# Patient Record
Sex: FEMALE | Race: WHITE | NOT HISPANIC OR LATINO | Employment: FULL TIME | URBAN - METROPOLITAN AREA
[De-identification: names, ages, dates, MRNs, and addresses within clinical notes are randomized per-mention and may not be internally consistent; named-entity substitution may affect disease eponyms.]

---

## 2018-03-24 ENCOUNTER — OFFICE VISIT (OUTPATIENT)
Dept: URGENT CARE | Facility: CLINIC | Age: 23
End: 2018-03-24
Payer: COMMERCIAL

## 2018-03-24 VITALS
WEIGHT: 115 LBS | OXYGEN SATURATION: 98 % | SYSTOLIC BLOOD PRESSURE: 98 MMHG | DIASTOLIC BLOOD PRESSURE: 64 MMHG | BODY MASS INDEX: 19.63 KG/M2 | HEART RATE: 76 BPM | HEIGHT: 64 IN | TEMPERATURE: 99 F

## 2018-03-24 DIAGNOSIS — T50.905A ADVERSE EFFECT OF DRUG, INITIAL ENCOUNTER: ICD-10-CM

## 2018-03-24 PROCEDURE — 99204 OFFICE O/P NEW MOD 45 MIN: CPT | Performed by: FAMILY MEDICINE

## 2018-03-24 RX ORDER — BUPROPION HYDROCHLORIDE 75 MG/1
75 TABLET ORAL 2 TIMES DAILY
COMMUNITY

## 2018-03-24 RX ORDER — PROPRANOLOL HYDROCHLORIDE 10 MG/1
10 TABLET ORAL 3 TIMES DAILY
COMMUNITY
End: 2023-09-22

## 2018-03-24 RX ORDER — PREDNISONE 10 MG/1
30 TABLET ORAL DAILY
Qty: 9 TAB | Refills: 0 | Status: SHIPPED | OUTPATIENT
Start: 2018-03-24 | End: 2018-03-27

## 2018-03-24 NOTE — PROGRESS NOTES
"Subjective:      Chief Complaint   Patient presents with   • Rash     all over body                Rash  This is a new problem. The current episode started this morning.   She is currently on day 6 of 7 of clindamycin for dental infection.   States infection has resolved. The rash is unchanged. The rash is diffuse. The rash is characterized by redness, swelling and itchiness. Pertinent negatives include no cough, fatigue, fever, shortness of breath or sore throat. Past treatments include nothing.            Past medical history was unremarkable and not pertinent to current issue  Social hx - denies tobacco, alcohol, drug use  Family hx was reviewed - no pertinent past family hx    Review of Systems   Constitutional: Negative for fever and fatigue.   HENT: Negative for eye redness, sore throat.    Respiratory: Negative for cough and shortness of breath.    CV - denies cp, sob  abd - denies n/v/d.  Ext - denies calf pain.  Psych - denies depression, anxiety  Skin: Positive for rash, itching   - denies dysuria, d/c  10 point ROS otherwise negative, except per HPI  .             Objective:   Blood pressure (!) 98/64, pulse 76, temperature 37.2 °C (99 °F), height 1.626 m (5' 4\"), weight 52.2 kg (115 lb), SpO2 98 %.    Physical Exam   Constitutional: pt is oriented to person, place, and time. Pt appears well-developed and well-nourished. No distress.   HENT:   Head: Normocephalic and atraumatic.   Mouth/Throat: Oropharynx is clear and moist and mucous membranes are normal. No uvula swelling. No oropharyngeal exudate, posterior oropharyngeal edema or posterior oropharyngeal erythema.   Eyes: Conjunctivae are normal.   Cardiovascular: Normal rate, regular rhythm and normal heart sounds.    Pulmonary/Chest: Effort normal and breath sounds normal. No respiratory distress. She has no wheezes.   Neurological: pt is alert and oriented to person, place, and time. No cranial nerve deficit.   Skin: Rash (diffuse evanescent " macules and wheals over torso and ext.   ) noted. Pt is not diaphoretic. There is erythema.   Psychiatric: pt's behavior is normal.   Nursing note and vitals reviewed.              Assessment/Plan:     1.hives  2.  Adverse effect of drug, initial encounter  -this likely represents clindamycin allergy  Discontinue clinda    - predniSONE (DELTASONE) 10 MG Tab; Take 3 Tabs by mouth every day for 3 days.  Dispense: 9 Tab; Refill: 0

## 2020-10-29 ENCOUNTER — HOSPITAL ENCOUNTER (OUTPATIENT)
Dept: LAB | Facility: MEDICAL CENTER | Age: 25
End: 2020-10-29
Payer: COMMERCIAL

## 2020-10-30 LAB
COVID ORDER STATUS COVID19: NORMAL
SARS-COV-2 RNA RESP QL NAA+PROBE: NOTDETECTED
SPECIMEN SOURCE: NORMAL

## 2020-11-10 ENCOUNTER — HOSPITAL ENCOUNTER (OUTPATIENT)
Dept: LAB | Facility: MEDICAL CENTER | Age: 25
End: 2020-11-10
Payer: COMMERCIAL

## 2022-12-02 ENCOUNTER — OFFICE VISIT (OUTPATIENT)
Dept: URGENT CARE | Facility: PHYSICIAN GROUP | Age: 27
End: 2022-12-02
Payer: COMMERCIAL

## 2022-12-02 VITALS
OXYGEN SATURATION: 97 % | RESPIRATION RATE: 16 BRPM | HEIGHT: 64 IN | DIASTOLIC BLOOD PRESSURE: 70 MMHG | HEART RATE: 74 BPM | TEMPERATURE: 97.5 F | SYSTOLIC BLOOD PRESSURE: 114 MMHG | WEIGHT: 150 LBS | BODY MASS INDEX: 25.61 KG/M2

## 2022-12-02 DIAGNOSIS — S06.0X0A CONCUSSION WITHOUT LOSS OF CONSCIOUSNESS, INITIAL ENCOUNTER: ICD-10-CM

## 2022-12-02 DIAGNOSIS — S09.90XA INJURY OF HEAD, INITIAL ENCOUNTER: ICD-10-CM

## 2022-12-02 PROCEDURE — 99203 OFFICE O/P NEW LOW 30 MIN: CPT | Performed by: PHYSICIAN ASSISTANT

## 2022-12-02 ASSESSMENT — ENCOUNTER SYMPTOMS
HEADACHES: 1
LOSS OF CONSCIOUSNESS: 0
FEVER: 0
BLURRED VISION: 0
VOMITING: 0
NAUSEA: 1
COUGH: 0
DOUBLE VISION: 0

## 2022-12-02 NOTE — PROGRESS NOTES
Subjective     Luz Maria Coreas is a 27 y.o. female who presents with Fall (Pt claims she did not lose consciousness after he fell. She has a huge bump on head and claims that she feels nausea. Happened this morning )            This new problem.  The patient presents to clinic complaining of an acute head injury following a fall.  The patient states earlier today she fell down the stairs.  The patient states she fell down approximately 5 stairs, hitting the back of her head.  The patient reports no LOC.  The patient states the stairs were carpeted.  The patient states she developed a lump to the back of her head following the injury.  The patient reports no open wounds/abrasions.  No active bleeding.  The patient states she has been experiencing increased fatigue/tiredness following the injury, as well as nausea.  The patient states she is also experiencing a mild headache.  The patient reports no associated vomiting, vision changes, or numbness, tingling, or weakness of the extremities.  The patient has not taken any OTC medications for her current symptoms.  The patient reports no prior history of concussion. The patient denies the use of anticoagulants.    Fall  Associated symptoms include headaches and nausea. Pertinent negatives include no fever, loss of consciousness or vomiting.       PMH:  has no past medical history on file.  MEDS:   Current Outpatient Medications:     buPROPion (WELLBUTRIN) 75 MG Tab, Take 75 mg by mouth 2 times a day., Disp: , Rfl:     propranolol (INDERAL) 10 MG Tab, Take 10 mg by mouth 3 times a day., Disp: , Rfl:   ALLERGIES:   Allergies   Allergen Reactions    Clindamycin      Rash     Penicillins      rash     SURGHX: History reviewed. No pertinent surgical history.  SOCHX:  reports that she has never smoked. She has never used smokeless tobacco. She reports that she does not drink alcohol and does not use drugs.  FH: Family history was reviewed, no pertinent findings to  "report      Review of Systems   Constitutional:  Negative for fever.   HENT:  Positive for congestion.    Eyes:  Negative for blurred vision and double vision.   Respiratory:  Negative for cough.    Gastrointestinal:  Positive for nausea. Negative for vomiting.   Neurological:  Positive for headaches. Negative for loss of consciousness.            Objective     /70 (BP Location: Right arm, Patient Position: Sitting, BP Cuff Size: Adult long)   Pulse 74   Temp 36.4 °C (97.5 °F) (Temporal)   Resp 16   Ht 1.626 m (5' 4\")   Wt 68 kg (150 lb)   SpO2 97%   BMI 25.75 kg/m²      Physical Exam  Constitutional:       General: She is not in acute distress.     Appearance: Normal appearance. She is not ill-appearing.   HENT:      Head: Normocephalic. Contusion present. No raccoon eyes, Weiner's sign, abrasion or laceration.      Comments:   A small palpable contusion is present to the left posterior parietal region of the scalp with tenderness palpation and mild edema.  No palpable step-off.  No palpable deformity.  No open wounds/abrasions.  No active bleeding.  No additional signs of trauma.     Right Ear: Tympanic membrane, ear canal and external ear normal.      Left Ear: Tympanic membrane, ear canal and external ear normal.      Nose: Nose normal.      Mouth/Throat:      Mouth: Mucous membranes are moist.      Pharynx: Oropharynx is clear. No posterior oropharyngeal erythema.   Eyes:      Extraocular Movements: Extraocular movements intact.      Conjunctiva/sclera: Conjunctivae normal.      Pupils: Pupils are equal, round, and reactive to light.   Cardiovascular:      Rate and Rhythm: Normal rate and regular rhythm.      Heart sounds: Normal heart sounds.   Pulmonary:      Effort: Pulmonary effort is normal. No respiratory distress.      Breath sounds: Normal breath sounds. No wheezing.   Musculoskeletal:         General: Normal range of motion.      Cervical back: Normal range of motion and neck supple. No " rigidity. No spinous process tenderness. Normal range of motion.   Skin:     General: Skin is warm and dry.   Neurological:      General: No focal deficit present.      Mental Status: She is alert and oriented to person, place, and time.      GCS: GCS eye subscore is 4. GCS verbal subscore is 5. GCS motor subscore is 6.      Cranial Nerves: Cranial nerves 2-12 are intact.      Sensory: Sensation is intact.      Motor: Motor function is intact.      Gait: Gait is intact.                           Assessment & Plan          1. Injury of head, initial encounter    2. Concussion without loss of consciousness, initial encounter    The patient's presenting symptoms and physical exam findings are consistent with an acute head injury.  On physical exam, the patient had a small palpable contusion to the posterior left parietal region of the scalp with tenderness to palpation and mild edema.  The patient had no palpable step-off, palpable deformity, open wounds/abrasions, active bleeding, or additional signs of trauma.  Remainder the patient's physical exam today in clinic was normal.  No focal neurological deficits were appreciated.  The patient is nontoxic and appears in no acute distress.  The patient's vital signs are stable and within normal limits.  She is afebrile today in clinic.  Based on the patient's presenting symptoms, I suspect the patient sustained a mild concussion.  Educated the patient on the signs and symptoms of a concussion.  Advised the patient to avoid strenuous/physical activities that may result in a recurrent head injury.  Advised the patient to avoid activities that require focus/concentration, including reading, watching TV, and scrolling on the phone.  The patient verbalized understanding.  Recommend OTC medications and supportive care for symptomatic management.  Recommend patient follow-up with PCP as needed.  Discussed strict ED precautions with the patient, and she verbalized  understanding.    Differential diagnoses, supportive care, and indications for immediate follow-up discussed with patient.   Instructed to return to clinic or nearest emergency department for any change in condition, further concerns, or worsening of symptoms.    OTC Tylenol for pain/discomfort  Apply ice to the affected area for symptomatic relief  Avoid activities that require focus and/or concentration -such as reading, watching TV, and scrolling on the phone  Avoid screen time as above  Avoid strenuous/physical activities that may result in recurrent head injury  Monitor for worsening signs or symptoms  Drink plenty of fluids  Follow-up with primary care as needed  AVS printed  Return to clinic or go to the ED if symptoms worsen or fail to improve, or if patient should develop worsening/increasing/persistent headache, vision changes, nausea, vomiting, photosensitivity, numbness, tingling, weakness of the extremities, neck pain, altered mental status, dizziness, fever/chills, and/or any concerning symptoms.    Discussed plan with the patient, and she agrees to the above.     I personally reviewed prior external notes and test results pertinent to today's visit.  I have independently reviewed and interpreted all diagnostics ordered during this urgent care visit.     Please note that this dictation was created using voice recognition software. I have made every reasonable attempt to correct obvious errors, but I expect that there may be errors of grammar and possibly content that I did not discover before finalizing the note.     This note was electronically signed by Gisele Willis PA-C

## 2023-04-12 PROBLEM — M65.341 TRIGGER RING FINGER OF RIGHT HAND: Status: ACTIVE | Noted: 2023-04-12

## 2023-04-12 PROBLEM — G56.03 CARPAL TUNNEL SYNDROME ON BOTH SIDES: Status: ACTIVE | Noted: 2023-04-12

## 2023-04-12 PROBLEM — G56.00 CARPAL TUNNEL SYNDROME: Status: ACTIVE | Noted: 2023-04-12

## 2023-05-22 ENCOUNTER — HOSPITAL ENCOUNTER (OUTPATIENT)
Facility: MEDICAL CENTER | Age: 28
End: 2023-05-22
Payer: COMMERCIAL

## 2023-07-12 PROBLEM — G56.23 CUBITAL TUNNEL SYNDROME, BILATERAL: Status: ACTIVE | Noted: 2023-07-12

## 2023-08-21 ENCOUNTER — APPOINTMENT (OUTPATIENT)
Dept: ADMISSIONS | Facility: MEDICAL CENTER | Age: 28
End: 2023-08-21
Attending: ORTHOPAEDIC SURGERY
Payer: COMMERCIAL

## 2023-09-18 ENCOUNTER — APPOINTMENT (OUTPATIENT)
Dept: ADMISSIONS | Facility: MEDICAL CENTER | Age: 28
End: 2023-09-18
Attending: ORTHOPAEDIC SURGERY
Payer: COMMERCIAL

## 2023-09-22 ENCOUNTER — PRE-ADMISSION TESTING (OUTPATIENT)
Dept: ADMISSIONS | Facility: MEDICAL CENTER | Age: 28
End: 2023-09-22
Payer: COMMERCIAL

## 2023-09-22 RX ORDER — DEXTROAMPHETAMINE SACCHARATE, AMPHETAMINE ASPARTATE MONOHYDRATE, DEXTROAMPHETAMINE SULFATE AND AMPHETAMINE SULFATE 2.5; 2.5; 2.5; 2.5 MG/1; MG/1; MG/1; MG/1
10 CAPSULE, EXTENDED RELEASE ORAL DAILY
COMMUNITY
Start: 2023-09-14

## 2023-10-02 ENCOUNTER — ANESTHESIA EVENT (OUTPATIENT)
Dept: SURGERY | Facility: MEDICAL CENTER | Age: 28
End: 2023-10-02
Payer: COMMERCIAL

## 2023-10-02 ENCOUNTER — PRE-ADMISSION TESTING (OUTPATIENT)
Dept: ADMISSIONS | Facility: MEDICAL CENTER | Age: 28
End: 2023-10-02
Attending: ORTHOPAEDIC SURGERY
Payer: COMMERCIAL

## 2023-10-02 DIAGNOSIS — Z01.812 PRE-OPERATIVE LABORATORY EXAMINATION: ICD-10-CM

## 2023-10-02 LAB
ANION GAP SERPL CALC-SCNC: 10 MMOL/L (ref 7–16)
BUN SERPL-MCNC: 10 MG/DL (ref 8–22)
CALCIUM SERPL-MCNC: 8.6 MG/DL (ref 8.5–10.5)
CHLORIDE SERPL-SCNC: 103 MMOL/L (ref 96–112)
CO2 SERPL-SCNC: 25 MMOL/L (ref 20–33)
CREAT SERPL-MCNC: 0.9 MG/DL (ref 0.5–1.4)
EST. AVERAGE GLUCOSE BLD GHB EST-MCNC: 166 MG/DL
GFR SERPLBLD CREATININE-BSD FMLA CKD-EPI: 88 ML/MIN/1.73 M 2
GLUCOSE SERPL-MCNC: 176 MG/DL (ref 65–99)
HBA1C MFR BLD: 7.4 % (ref 4–5.6)
HCG SERPL QL: NEGATIVE
POTASSIUM SERPL-SCNC: 4.1 MMOL/L (ref 3.6–5.5)
SODIUM SERPL-SCNC: 138 MMOL/L (ref 135–145)

## 2023-10-02 PROCEDURE — 84703 CHORIONIC GONADOTROPIN ASSAY: CPT

## 2023-10-02 PROCEDURE — 83036 HEMOGLOBIN GLYCOSYLATED A1C: CPT

## 2023-10-02 PROCEDURE — 80048 BASIC METABOLIC PNL TOTAL CA: CPT

## 2023-10-02 PROCEDURE — 36415 COLL VENOUS BLD VENIPUNCTURE: CPT

## 2023-10-03 ENCOUNTER — ANESTHESIA (OUTPATIENT)
Dept: SURGERY | Facility: MEDICAL CENTER | Age: 28
End: 2023-10-03
Payer: COMMERCIAL

## 2023-10-03 ENCOUNTER — HOSPITAL ENCOUNTER (OUTPATIENT)
Facility: MEDICAL CENTER | Age: 28
End: 2023-10-03
Attending: ORTHOPAEDIC SURGERY | Admitting: ORTHOPAEDIC SURGERY
Payer: COMMERCIAL

## 2023-10-03 VITALS
HEIGHT: 64 IN | TEMPERATURE: 97.2 F | SYSTOLIC BLOOD PRESSURE: 115 MMHG | OXYGEN SATURATION: 94 % | WEIGHT: 154.1 LBS | BODY MASS INDEX: 26.31 KG/M2 | HEART RATE: 76 BPM | RESPIRATION RATE: 17 BRPM | DIASTOLIC BLOOD PRESSURE: 68 MMHG

## 2023-10-03 DIAGNOSIS — G89.18 POSTOPERATIVE PAIN: ICD-10-CM

## 2023-10-03 PROCEDURE — 160029 HCHG SURGERY MINUTES - 1ST 30 MINS LEVEL 4: Performed by: ORTHOPAEDIC SURGERY

## 2023-10-03 PROCEDURE — 700101 HCHG RX REV CODE 250: Performed by: ANESTHESIOLOGY

## 2023-10-03 PROCEDURE — 700101 HCHG RX REV CODE 250: Performed by: ORTHOPAEDIC SURGERY

## 2023-10-03 PROCEDURE — 700111 HCHG RX REV CODE 636 W/ 250 OVERRIDE (IP): Performed by: ANESTHESIOLOGY

## 2023-10-03 PROCEDURE — 700111 HCHG RX REV CODE 636 W/ 250 OVERRIDE (IP): Mod: JZ | Performed by: ORTHOPAEDIC SURGERY

## 2023-10-03 PROCEDURE — 160046 HCHG PACU - 1ST 60 MINS PHASE II: Performed by: ORTHOPAEDIC SURGERY

## 2023-10-03 PROCEDURE — 700105 HCHG RX REV CODE 258: Performed by: ORTHOPAEDIC SURGERY

## 2023-10-03 PROCEDURE — 29848 WRIST ENDOSCOPY/SURGERY: CPT | Mod: LT | Performed by: ORTHOPAEDIC SURGERY

## 2023-10-03 PROCEDURE — 160002 HCHG RECOVERY MINUTES (STAT): Performed by: ORTHOPAEDIC SURGERY

## 2023-10-03 PROCEDURE — 160025 RECOVERY II MINUTES (STATS): Performed by: ORTHOPAEDIC SURGERY

## 2023-10-03 PROCEDURE — 160009 HCHG ANES TIME/MIN: Performed by: ORTHOPAEDIC SURGERY

## 2023-10-03 PROCEDURE — 160035 HCHG PACU - 1ST 60 MINS PHASE I: Performed by: ORTHOPAEDIC SURGERY

## 2023-10-03 PROCEDURE — 160048 HCHG OR STATISTICAL LEVEL 1-5: Performed by: ORTHOPAEDIC SURGERY

## 2023-10-03 RX ORDER — TRAMADOL HYDROCHLORIDE 50 MG/1
50 TABLET ORAL EVERY 6 HOURS PRN
Qty: 15 TABLET | Refills: 0 | Status: SHIPPED | OUTPATIENT
Start: 2023-10-03 | End: 2023-10-08

## 2023-10-03 RX ORDER — HYDROMORPHONE HYDROCHLORIDE 1 MG/ML
0.1 INJECTION, SOLUTION INTRAMUSCULAR; INTRAVENOUS; SUBCUTANEOUS
Status: DISCONTINUED | OUTPATIENT
Start: 2023-10-03 | End: 2023-10-03 | Stop reason: HOSPADM

## 2023-10-03 RX ORDER — SODIUM CHLORIDE, SODIUM LACTATE, POTASSIUM CHLORIDE, CALCIUM CHLORIDE 600; 310; 30; 20 MG/100ML; MG/100ML; MG/100ML; MG/100ML
INJECTION, SOLUTION INTRAVENOUS CONTINUOUS
Status: DISCONTINUED | OUTPATIENT
Start: 2023-10-03 | End: 2023-10-03 | Stop reason: HOSPADM

## 2023-10-03 RX ORDER — DIPHENHYDRAMINE HYDROCHLORIDE 50 MG/ML
12.5 INJECTION INTRAMUSCULAR; INTRAVENOUS
Status: DISCONTINUED | OUTPATIENT
Start: 2023-10-03 | End: 2023-10-03 | Stop reason: HOSPADM

## 2023-10-03 RX ORDER — ONDANSETRON 2 MG/ML
INJECTION INTRAMUSCULAR; INTRAVENOUS PRN
Status: DISCONTINUED | OUTPATIENT
Start: 2023-10-03 | End: 2023-10-03 | Stop reason: SURG

## 2023-10-03 RX ORDER — OXYCODONE HCL 5 MG/5 ML
5 SOLUTION, ORAL ORAL
Status: DISCONTINUED | OUTPATIENT
Start: 2023-10-03 | End: 2023-10-03 | Stop reason: HOSPADM

## 2023-10-03 RX ORDER — OXYCODONE HCL 5 MG/5 ML
10 SOLUTION, ORAL ORAL
Status: DISCONTINUED | OUTPATIENT
Start: 2023-10-03 | End: 2023-10-03 | Stop reason: HOSPADM

## 2023-10-03 RX ORDER — HYDROMORPHONE HYDROCHLORIDE 1 MG/ML
0.4 INJECTION, SOLUTION INTRAMUSCULAR; INTRAVENOUS; SUBCUTANEOUS
Status: DISCONTINUED | OUTPATIENT
Start: 2023-10-03 | End: 2023-10-03 | Stop reason: HOSPADM

## 2023-10-03 RX ORDER — LIDOCAINE HYDROCHLORIDE 10 MG/ML
INJECTION, SOLUTION EPIDURAL; INFILTRATION; INTRACAUDAL; PERINEURAL
Status: DISCONTINUED
Start: 2023-10-03 | End: 2023-10-03 | Stop reason: HOSPADM

## 2023-10-03 RX ORDER — CEFAZOLIN SODIUM 1 G/3ML
2 INJECTION, POWDER, FOR SOLUTION INTRAMUSCULAR; INTRAVENOUS ONCE
Status: COMPLETED | OUTPATIENT
Start: 2023-10-03 | End: 2023-10-03

## 2023-10-03 RX ORDER — LIDOCAINE HYDROCHLORIDE 10 MG/ML
INJECTION, SOLUTION INFILTRATION; PERINEURAL
Status: DISCONTINUED | OUTPATIENT
Start: 2023-10-03 | End: 2023-10-03 | Stop reason: HOSPADM

## 2023-10-03 RX ORDER — BUPIVACAINE HYDROCHLORIDE 5 MG/ML
INJECTION, SOLUTION EPIDURAL; INTRACAUDAL
Status: DISCONTINUED | OUTPATIENT
Start: 2023-10-03 | End: 2023-10-03 | Stop reason: HOSPADM

## 2023-10-03 RX ORDER — MIDAZOLAM HYDROCHLORIDE 1 MG/ML
INJECTION INTRAMUSCULAR; INTRAVENOUS PRN
Status: DISCONTINUED | OUTPATIENT
Start: 2023-10-03 | End: 2023-10-03 | Stop reason: SURG

## 2023-10-03 RX ORDER — DEXMEDETOMIDINE HYDROCHLORIDE 100 UG/ML
INJECTION, SOLUTION INTRAVENOUS PRN
Status: DISCONTINUED | OUTPATIENT
Start: 2023-10-03 | End: 2023-10-03 | Stop reason: SURG

## 2023-10-03 RX ORDER — HYDROMORPHONE HYDROCHLORIDE 1 MG/ML
0.2 INJECTION, SOLUTION INTRAMUSCULAR; INTRAVENOUS; SUBCUTANEOUS
Status: DISCONTINUED | OUTPATIENT
Start: 2023-10-03 | End: 2023-10-03 | Stop reason: HOSPADM

## 2023-10-03 RX ORDER — ONDANSETRON 2 MG/ML
4 INJECTION INTRAMUSCULAR; INTRAVENOUS
Status: DISCONTINUED | OUTPATIENT
Start: 2023-10-03 | End: 2023-10-03 | Stop reason: HOSPADM

## 2023-10-03 RX ORDER — BUPIVACAINE HYDROCHLORIDE 5 MG/ML
INJECTION, SOLUTION EPIDURAL; INTRACAUDAL
Status: DISCONTINUED
Start: 2023-10-03 | End: 2023-10-03 | Stop reason: HOSPADM

## 2023-10-03 RX ADMIN — ONDANSETRON 8 MG: 2 INJECTION INTRAMUSCULAR; INTRAVENOUS at 07:09

## 2023-10-03 RX ADMIN — PROPOFOL 40 MG: 10 INJECTION, EMULSION INTRAVENOUS at 07:09

## 2023-10-03 RX ADMIN — DEXMEDETOMIDINE 10 MCG: 100 INJECTION, SOLUTION INTRAVENOUS at 06:58

## 2023-10-03 RX ADMIN — MIDAZOLAM 2 MG: 1 INJECTION, SOLUTION INTRAMUSCULAR; INTRAVENOUS at 06:59

## 2023-10-03 RX ADMIN — FENTANYL CITRATE 100 MCG: 50 INJECTION, SOLUTION INTRAMUSCULAR; INTRAVENOUS at 06:57

## 2023-10-03 RX ADMIN — PROPOFOL 25 MG: 10 INJECTION, EMULSION INTRAVENOUS at 07:03

## 2023-10-03 RX ADMIN — CEFAZOLIN 2 G: 1 INJECTION, POWDER, FOR SOLUTION INTRAMUSCULAR; INTRAVENOUS at 07:01

## 2023-10-03 RX ADMIN — SODIUM CHLORIDE, POTASSIUM CHLORIDE, SODIUM LACTATE AND CALCIUM CHLORIDE: 600; 310; 30; 20 INJECTION, SOLUTION INTRAVENOUS at 06:21

## 2023-10-03 ASSESSMENT — PAIN SCALES - GENERAL: PAIN_LEVEL: 0

## 2023-10-03 ASSESSMENT — FIBROSIS 4 INDEX
FIB4 SCORE: 0.42
FIB4 SCORE: 0.42

## 2023-10-03 NOTE — H&P
Surgery Orthopedic History & Physical Note    Date  10/3/2023    Primary Care Physician  Charlene Ramirez M.D.      Pre-Op Diagnosis Codes:     * Carpal tunnel syndrome [G56.00]    HPI  This is a 28 y.o. female who presented with Subjective   Patient ID:  Luz Maria Coreas is a 28 y.o. female.    Chief Complaint:  Pain of the Right Wrist    Last Surgery: Right Endoscopic Carpal Tunnel Release - Right and Right Ring Finger Trigger Release - Right on 5/9/2023    HPI  Patient is 2 weeks status post right endoscopic carpal tunnel release and right ring finger trigger release. She is doing well with no complaints or concerns. Her nighttime symptoms are greatly improved on the right side. Her left carpal tunnel symptoms have remained relatively the same. No new injuries.      Review of Systems    Physical Exam  General:  Well appearing, in no acute distress. Appropriate and cooperative with the exam.  HEENT: Normocephalic, atraumatic. Cranial nerves II-XII are grossly intact.  Cardiovascular: 2+ distal pulses. No cyanosis, clubbing, or edema.  Pulmonary: Breathing comfortably on room air without labor.  Abdomen: Soft and unremarkable.  Skin: No rashes, jaundice, or cyanosis.  Lymphatic: No epitrochlear lymphadenopathy.  Spine:  Clinically midline.   Gait:  Patient ambulates without a limp.  Musculoskeletal: No locking or catching. No thenar atrophy. All flexor and extensor tendons intact.  Neurological: Sensation intact to light touch median, radial, ulnar nerves. Negative Tinel's, compression Phalen's.    Imaging  No new imaging today    Encounter Diagnoses:   2 weeks status post right endoscopic carpal tunnel release and right ring finger trigger release  Left carpal tunnel syndrome    Plan  I had a thorough discussion with the patient regarding the diagnosis. Her sutures were removed and steri-stripped today in clinic. Work on scar massage, range of motion, stretching, and strengthening exercises. Continue wearing  the wrist brace at night for the next month. She would like to proceed with the left side. We will proceed with operative management. I recommended a left endoscopic carpal tunnel release, repairs as indicated.     Risks of surgery include, but not limited to, wound problems, infection, nerve injury, vascular injury, need for further surgery. There is also risk for weakness, stiffness, blood clots, recurrence of any deformity and chance for reinjury. I discussed the risks/ benefits/ complications associated with narcotic use including the potential for addiction. All of the patient's questions were answered today. We will schedule this in the near future at her convenience. I will follow up with her postoperatively.    Orders Placed This Encounter    Suture/Staple Removal     No follow-ups on file.    I, Catalino Capps, am scribing for, and in the presence of Fabricio Crockett MD.    I, Fabricio Crockett MD, personally performed the services described in this documentation, as scribed by, Catalino Capps, in my presence. I do hereby attest this information is true, accurate, and complete to the best of my knowledge.       Past Medical History:   Diagnosis Date    Anesthesia 09/22/2023    PONV,  pt with history of motion sickness    Diabetes (HCC) 09/22/2023    Type 1, has pump and humulin prn    Pain 09/22/2023    left hand numbness    PONV (postoperative nausea and vomiting) 09/22/2023    pt with history of motion sickness    Psychiatric problem 09/22/2023    ADHD, depression, anxiety, medicated       Past Surgical History:   Procedure Laterality Date    PB WRIST ARTHROSCOP,RELEASE XVERS LIG Right 05/09/2023    Procedure: RIGHT ENDOSCOPIC CARPAL TUNNEL RELEASE;  Surgeon: Fabricio Crockett M.D.;  Location: [facility];  Service: Orthopedics    PB INCISE FINGER TENDON SHEATH Right 05/09/2023    Procedure: RIGHT RING FINGER TRIGGER RELEASE;  Surgeon: Fabricio Crockett M.D.;  Location: [facility];  Service: Orthopedics       Current Facility-Administered Medications   Medication Dose Route Frequency Provider Last Rate Last Admin    LIDOCAINE HCL (PF) 1 % INJ SOLN             BUPIVACAINE HCL (PF) 0.5 % INJ SOLN             lidocaine (Xylocaine) 1 % injection 0.5 mL  0.5 mL Intradermal Once PRN Fabricio Crockett M.D.        lactated ringers infusion   Intravenous Continuous Fabricio Crockett M.D. 10 mL/hr at 10/03/23 0621 New Bag at 10/03/23 0621    ceFAZolin (Ancef) injection 2 g  2 g Intravenous Once Fabricio Crockett M.D.           Social History     Socioeconomic History    Marital status: Single     Spouse name: Not on file    Number of children: Not on file    Years of education: Not on file    Highest education level: Not on file   Occupational History    Not on file   Tobacco Use    Smoking status: Never    Smokeless tobacco: Never   Vaping Use    Vaping Use: Never used   Substance and Sexual Activity    Alcohol use: Yes     Comment: 2-3 times a week, wine    Drug use: No    Sexual activity: Not on file   Other Topics Concern    Not on file   Social History Narrative    Not on file     Social Determinants of Health     Financial Resource Strain: Not on file   Food Insecurity: Not on file   Transportation Needs: Not on file   Physical Activity: Not on file   Stress: Not on file   Social Connections: Not on file   Intimate Partner Violence: Not on file   Housing Stability: Not on file       History reviewed. No pertinent family history.    Allergies  Penicillins and Amoxicillin    Review of Systems  Negative    Physical Exam    Vital Signs                          Labs:      Recent Labs     10/02/23  0724   SODIUM 138   POTASSIUM 4.1   CHLORIDE 103   CO2 25   GLUCOSE 176*   BUN 10   CREATININE 0.90   CALCIUM 8.6               Radiology:  No orders to display         Assessment/Plan:  Pre-Op Diagnosis Codes:     * Carpal tunnel syndrome [G56.00]  Procedure(s):  LEFT ENDOSCOPIC CARPAL TUNNEL RELEASE

## 2023-10-03 NOTE — OR NURSING
Insulin pump to left back, glucose monitor to right thigh.  Blood glucose 203 at 0600 today on arrival to unit

## 2023-10-03 NOTE — DISCHARGE INSTRUCTIONS
If any questions arise, call your provider.  If your provider is not available, please feel free to call the Surgical Center at [phone].    MEDICATIONS: Resume taking daily medication.  Take prescribed pain medication with food.  If no medication is prescribed, you may take non-aspirin pain medication if needed.  PAIN MEDICATION CAN BE VERY CONSTIPATING.  Take a stool softener or laxative such as senokot, pericolace, or milk of magnesia if needed.    Last pain medication given NONE.    Keep dressing clean and dry  Elevate extremity  May remove dressing 5-6 days and shower  Encourage moving all fingers

## 2023-10-03 NOTE — ANESTHESIA PREPROCEDURE EVALUATION
Case: [id number] Date/Time: 10/03/23 0645    Procedure: LEFT ENDOSCOPIC CARPAL TUNNEL RELEASE    Diagnosis: Carpal tunnel syndrome [G56.00]    Pre-op diagnosis: Carpal tunnel syndrome [G56.00]    Location: [facility] ROOM 21 / SURGERY SAME DAY [facility]    Surgeons: Fabricio Crockett M.D.          Relevant Problems   No relevant active problems       Physical Exam    Airway   Mallampati: II  TM distance: >3 FB  Neck ROM: full       Cardiovascular - normal exam  Rhythm: regular  Rate: normal  (-) murmur     Dental - normal exam           Pulmonary - normal exam  Breath sounds clear to auscultation     Abdominal    Neurological - normal exam                 Anesthesia Plan    ASA 2       Plan - MAC               Induction: intravenous    Postoperative Plan: Postoperative administration of opioids is intended.    Pertinent diagnostic labs and testing reviewed    Informed Consent:    Anesthetic plan and risks discussed with patient.    Use of blood products discussed with: patient whom consented to blood products.

## 2023-10-03 NOTE — ANESTHESIA POSTPROCEDURE EVALUATION
Patient: Luz Maria Coreas    Procedure Summary     Date: 10/03/23 Room / Location: [facility] ROOM 21 / SURGERY SAME DAY [facility]    Anesthesia Start: 0657 Anesthesia Stop: 0721    Procedure: LEFT ENDOSCOPIC CARPAL TUNNEL RELEASE (Left: Hand) Diagnosis:       Carpal tunnel syndrome      (Carpal tunnel syndrome left )    Surgeons: Fabricio Crockett M.D. Responsible Provider: Ge Anaya M.D.    Anesthesia Type: MAC ASA Status: 2          Final Anesthesia Type: MAC  Last vitals  BP   Blood Pressure: 111/73    Temp   36.8 °C (98.3 °F)    Pulse   82   Resp   18    SpO2   98 %      Anesthesia Post Evaluation    Patient location during evaluation: PACU  Patient participation: complete - patient participated  Level of consciousness: awake and alert  Pain score: 0    Airway patency: patent  Anesthetic complications: no  Cardiovascular status: hemodynamically stable  Respiratory status: acceptable  Hydration status: euvolemic    PONV: none          No notable events documented.     Nurse Pain Score: 0 (NPRS)

## 2023-10-03 NOTE — OR NURSING
0719 pt received from OR, report given by anesthesia and RN, no oral airway in place, VSS, pt sleeping in bed left arm elevated and ice applied.     0815 Patient's ride Jacquelyn is on her way to [facility]. Patient is more awake, drinking water without complications, now getting dressed. Denies pain or nausea.    0835 pt escorted out in wheelchair with RN, C/C instructions reviewed with pt and family, all questions answered.

## 2023-10-03 NOTE — ANESTHESIA TIME REPORT
Anesthesia Start and Stop Event Times     Date Time Event    10/3/2023 0640 Ready for Procedure     0657 Anesthesia Start     0721 Anesthesia Stop        Responsible Staff  10/03/23    Name Role Begin End    Ge Anaya M.D. Anesth 0657 0721        Overtime Reason:  no overtime (within assigned shift)    Comments:

## 2023-10-03 NOTE — DISCHARGE INSTR - OTHER INFO
"{[facility] additional discharge instructions:[id number]::\"Minimal activity at home for *** days\"}                                                                                                          DR. EMERSON POST-OPERATIVE INSTRUCTIONS    You have just undergone a sugery by Dr. Emerson in the operating room.  It is our wish that your postoperative recovery be as quick and comfortable as possible.  Please carefully review the following items that are important for your recovery.    After any operation, a certain degree of pain is to be expected. Take Advil (ibuprofen) and Extra Strength Tylenol as first line medications for mild to moderate pain. Taking each one every 6 hours, and staggering them so that you are taking one medicine every 3 hours, is the most effective. Refer to dosing instructions on the bottle, but in general ibuprofen dose is 600-800mg and Tylenol dose is 500-1000mg. For most small procedures, this should be enough to keep you comfortable. Take these medications until your followup visit. You may have been given a small prescription for stronger pain medicine which will help relieve more severe pain.  Pain medicine may make you drowsy so please keep this in mind.  Do not drive while taking pain medicine.      When you go home, please keep your operated arm elevated at all times (above the level of your heart).  If you do this, your swelling will resolve more quickly and your pain will be improve more quickly as well. You may also place an ice pack over your dressing or splint to help with swelling and pain.    It is also very important to keep your fingers moving after most procedures, unless you had a tendon repair or fracture repair in which case you will be in a splint. Keep all of your fingers moving through a full range of motion to prevent stiffness.    For small hand procedures such as carpal tunnel release, trigger finger release, and cyst excision, the dressing that you have on your extremity " should remain on for 3-4 days. It may then be removed, you can wash the incision gently with soap and water, and keep the incision covered with a band-aid or similar clean dressing. For larger procedures or if you have a splint on, these should remain on until follow up or as specifically instructed. If you feel that your dressing is too tight during the first 3 days after surgery, you may loosen it. It is normal to see minor staining on the dressing after surgery. If there is significant bleeding, you are advised to call the office during regular office hours to have this checked.  Make sure that your dressing is kept dry at all times.  You can take a shower if you cover your arm with a plastic bag. If your dressing gets wet, replace it with sterile dressing that you can obtain from your local drug store.    Follow-up appointments can usually be found on the pre-op paperwork if not please call our office for a follow-up appointment, [phone]. Follow up after surgery is typically 10-14 days, unless you were specifically instructed otherwise. The sutures will be removed and you may be asked to see a hand therapist to optimize your functional result. Each of the hand therapists that you will be referred to have received special training in the care of the hand and upper extremity.    If you have questions regarding your surgery postop that you feel requires attention, please call the office at [phone] during business hours, or [phone] after hours for the answering service. If you feel that you have a surgical emergency postoperatively that requires immediate attention, please call the above numbers or go to the Emergency Department and ask for the Orthopedic Surgeon on call.

## 2023-10-04 NOTE — OP REPORT
DATE OF SERVICE:  10/03/2023     PREOPERATIVE DIAGNOSIS:  Left carpal tunnel syndrome.     POSTOPERATIVE DIAGNOSIS:  Left carpal tunnel syndrome.     PROCEDURE:  Left endoscopic carpal tunnel release.     SURGEON:  Fabricio Crockett MD     ANESTHESIA:  IV sedation, local.     ESTIMATED BLOOD LOSS:  Minimal.     DRAINS:  None.     COMPLICATIONS:  None.     INDICATIONS:  The patient is a female who had bilateral carpal tunnel   syndrome.  We have already done on the right side, wishes to proceed with the   left.  Risks, benefits, complications and alternatives were explained to the   patient.  All questions were answered.  No guarantees given.  Signed consent   was obtained.     PROCEDURE:  The patient was taken to the operating room, laid supine on the   table.  All bony prominences were well padded.  Adequate IV sedation was   given.  The left upper extremity was prepped and draped in the usual sterile   fashion using a ChloraPrep.  Limb was exsanguinated with elevation, tourniquet   raised and total tourniquet time was under 15 minutes.  The area was   infiltrated with Marcaine and lidocaine, both without epinephrine.  A proximal   incision was made through the skin and subcutaneous tissues, bluntly   dissected.  A probe was placed underneath the transverse carpal ligament to   remove any synovium.  With the wrist in extension, a small incision was made   in the palm.  This was then removed and a scope sheath inserted in a similar   fashion.  Hand was placed in the hand eric.  The scope was inserted   proximally and distally.  We had good visualization of fibers, it was probed.    A mid portion cut was made and a curved cutting knife was used to complete   the cut distally.  The scope was inserted distally, leaving proximally, again   good visualization, again probed.  The remaining cut was performed with a   curved cutting knife, found to be a nice release along its entirety.  The   scope sheath was removed.  The  wound was irrigated, closed with nylon in   simple fashion.  A sterile dressing, Xeroform, 4x4's, Sof-Rol, bias was   applied.  All needle and sponge counts correct.  The patient tolerated the   procedure well and was transferred to recovery room in stable condition.        ______________________________  MD CRISTINE SINGH/LOLIS    DD:  10/04/2023 06:51  DT:  10/04/2023 06:59    Job#:  [id number]

## (undated) DEVICE — SENSOR OXIMETER ADULT SPO2 RD SET (20EA/BX)

## (undated) DEVICE — CANNULA O2 COMFORT SOFT EAR ADULT 7 FT TUBING (50/CA)

## (undated) DEVICE — TUBING CLEARLINK DUO-VENT - C-FLO (48EA/CA)

## (undated) DEVICE — KIT  I.V. START (100EA/CA)

## (undated) DEVICE — LACTATED RINGERS INJ 1000 ML - (14EA/CA 60CA/PF)

## (undated) DEVICE — GLOVE BIOGEL PI INDICATOR SZ 8.0 SURGICAL PF LF -(50/BX 4BX/CA)

## (undated) DEVICE — COVER LIGHT HANDLE FLEXIBLE - SOFT (2EA/PK 80PK/CA)

## (undated) DEVICE — SUCTION INSTRUMENT YANKAUER BULBOUS TIP W/O VENT (50EA/CA)

## (undated) DEVICE — PADDING CAST 4 IN STERILE - 4 X 4 YDS (24/CA)

## (undated) DEVICE — TOWEL STOP TIMEOUT SAFETY FLAG (40EA/CA)

## (undated) DEVICE — TUBE CONNECTING SUCTION - CLEAR PLASTIC STERILE 72 IN (50EA/CA)

## (undated) DEVICE — KIT CARPAL TUNNEL ENDOSCOPIC

## (undated) DEVICE — SUTURE GENERAL

## (undated) DEVICE — WATER IRRIGATION STERILE 1000ML (12EA/CA)

## (undated) DEVICE — SUTURE 4-0 ETHILON FS-2 18 (36PK/BX)"

## (undated) DEVICE — PACK UPPER EXTREMITY (2EA/CA)

## (undated) DEVICE — STOCKINET BIAS 4 IN STERILE - (20/CA)

## (undated) DEVICE — PAD PREP 24 X 48 CUFFED - (100/CA)

## (undated) DEVICE — CANISTER SUCTION 3000ML MECHANICAL FILTER AUTO SHUTOFF MEDI-VAC NONSTERILE LF DISP  (40EA/CA)

## (undated) DEVICE — MASK OXYGEN VNYL ADLT MED CONC WITH 7 FOOT TUBING  - (50EA/CA)

## (undated) DEVICE — CANISTER SUCTION RIGID RED 1500CC (40EA/CA)

## (undated) DEVICE — SLEEVE VASO CALF MED - (10PR/CA)

## (undated) DEVICE — SODIUM CHL IRRIGATION 0.9% 1000ML (12EA/CA)

## (undated) DEVICE — SET LEADWIRE 5 LEAD BEDSIDE DISPOSABLE ECG (1SET OF 5/EA)

## (undated) DEVICE — GLOVE SZ 8 BIOGEL PI MICRO - PF LF (50PR/BX)

## (undated) DEVICE — GOWN WARMING STANDARD FLEX - (30/CA)